# Patient Record
Sex: MALE | Race: WHITE | Employment: OTHER | URBAN - METROPOLITAN AREA
[De-identification: names, ages, dates, MRNs, and addresses within clinical notes are randomized per-mention and may not be internally consistent; named-entity substitution may affect disease eponyms.]

---

## 2018-04-19 ENCOUNTER — OFFICE VISIT (OUTPATIENT)
Dept: FAMILY MEDICINE CLINIC | Age: 78
End: 2018-04-19
Payer: MEDICARE

## 2018-04-19 VITALS
WEIGHT: 217 LBS | DIASTOLIC BLOOD PRESSURE: 100 MMHG | TEMPERATURE: 98.7 F | HEART RATE: 83 BPM | OXYGEN SATURATION: 96 % | HEIGHT: 67 IN | RESPIRATION RATE: 12 BRPM | BODY MASS INDEX: 34.06 KG/M2 | SYSTOLIC BLOOD PRESSURE: 150 MMHG

## 2018-04-19 DIAGNOSIS — H02.135 SENILE ECTROPION OF BOTH LOWER EYELIDS: ICD-10-CM

## 2018-04-19 DIAGNOSIS — R25.1 TREMOR OF LEFT HAND: ICD-10-CM

## 2018-04-19 DIAGNOSIS — R35.0 URINARY FREQUENCY: ICD-10-CM

## 2018-04-19 DIAGNOSIS — E55.9 VITAMIN D DEFICIENCY: ICD-10-CM

## 2018-04-19 DIAGNOSIS — Z86.73 HISTORY OF CVA (CEREBROVASCULAR ACCIDENT) WITHOUT RESIDUAL DEFICITS: ICD-10-CM

## 2018-04-19 DIAGNOSIS — I10 ESSENTIAL HYPERTENSION: Primary | ICD-10-CM

## 2018-04-19 DIAGNOSIS — H02.132 SENILE ECTROPION OF BOTH LOWER EYELIDS: ICD-10-CM

## 2018-04-19 DIAGNOSIS — R53.83 FATIGUE, UNSPECIFIED TYPE: ICD-10-CM

## 2018-04-19 DIAGNOSIS — H04.123 DRY EYES: ICD-10-CM

## 2018-04-19 DIAGNOSIS — R41.3 MEMORY LOSS: ICD-10-CM

## 2018-04-19 PROCEDURE — G8427 DOCREV CUR MEDS BY ELIG CLIN: HCPCS | Performed by: FAMILY MEDICINE

## 2018-04-19 PROCEDURE — G8417 CALC BMI ABV UP PARAM F/U: HCPCS | Performed by: FAMILY MEDICINE

## 2018-04-19 PROCEDURE — 1123F ACP DISCUSS/DSCN MKR DOCD: CPT | Performed by: FAMILY MEDICINE

## 2018-04-19 PROCEDURE — 93000 ELECTROCARDIOGRAM COMPLETE: CPT | Performed by: FAMILY MEDICINE

## 2018-04-19 PROCEDURE — 99203 OFFICE O/P NEW LOW 30 MIN: CPT | Performed by: FAMILY MEDICINE

## 2018-04-19 PROCEDURE — 1036F TOBACCO NON-USER: CPT | Performed by: FAMILY MEDICINE

## 2018-04-19 PROCEDURE — 4040F PNEUMOC VAC/ADMIN/RCVD: CPT | Performed by: FAMILY MEDICINE

## 2018-04-19 RX ORDER — TAMSULOSIN HYDROCHLORIDE 0.4 MG/1
0.4 CAPSULE ORAL DAILY
Qty: 30 CAPSULE | Refills: 3 | Status: SHIPPED | OUTPATIENT
Start: 2018-04-19 | End: 2018-05-21 | Stop reason: SDUPTHER

## 2018-04-19 RX ORDER — ASPIRIN 81 MG/1
81 TABLET ORAL DAILY
Qty: 30 TABLET | Refills: 3 | Status: SHIPPED | OUTPATIENT
Start: 2018-04-19

## 2018-04-19 RX ORDER — HYDROCHLOROTHIAZIDE 25 MG/1
25 TABLET ORAL DAILY
Qty: 30 TABLET | Refills: 3 | Status: SHIPPED | OUTPATIENT
Start: 2018-04-19 | End: 2018-05-21 | Stop reason: SDUPTHER

## 2018-04-19 ASSESSMENT — PATIENT HEALTH QUESTIONNAIRE - PHQ9
SUM OF ALL RESPONSES TO PHQ9 QUESTIONS 1 & 2: 2
1. LITTLE INTEREST OR PLEASURE IN DOING THINGS: 1
2. FEELING DOWN, DEPRESSED OR HOPELESS: 1
SUM OF ALL RESPONSES TO PHQ QUESTIONS 1-9: 2

## 2018-04-19 ASSESSMENT — ENCOUNTER SYMPTOMS
EYE REDNESS: 1
GASTROINTESTINAL NEGATIVE: 1
ALLERGIC/IMMUNOLOGIC NEGATIVE: 1
RESPIRATORY NEGATIVE: 1

## 2018-05-14 DIAGNOSIS — R25.1 TREMOR OF LEFT HAND: ICD-10-CM

## 2018-05-14 DIAGNOSIS — E55.9 VITAMIN D DEFICIENCY: ICD-10-CM

## 2018-05-14 DIAGNOSIS — R53.83 FATIGUE, UNSPECIFIED TYPE: ICD-10-CM

## 2018-05-14 DIAGNOSIS — R41.3 MEMORY LOSS: ICD-10-CM

## 2018-05-14 DIAGNOSIS — I10 ESSENTIAL HYPERTENSION: ICD-10-CM

## 2018-05-14 LAB
ALBUMIN SERPL-MCNC: 4.3 G/DL (ref 3.9–4.9)
ALP BLD-CCNC: 67 U/L (ref 35–104)
ALT SERPL-CCNC: 21 U/L (ref 0–41)
ANION GAP SERPL CALCULATED.3IONS-SCNC: 19 MEQ/L (ref 7–13)
AST SERPL-CCNC: 21 U/L (ref 0–40)
BASOPHILS ABSOLUTE: 0.1 K/UL (ref 0–0.2)
BASOPHILS RELATIVE PERCENT: 0.5 %
BILIRUB SERPL-MCNC: 0.8 MG/DL (ref 0–1.2)
BUN BLDV-MCNC: 13 MG/DL (ref 8–23)
CALCIUM SERPL-MCNC: 9.3 MG/DL (ref 8.6–10.2)
CHLORIDE BLD-SCNC: 96 MEQ/L (ref 98–107)
CHOLESTEROL, TOTAL: 271 MG/DL (ref 0–199)
CO2: 24 MEQ/L (ref 22–29)
CREAT SERPL-MCNC: 1.03 MG/DL (ref 0.7–1.2)
EOSINOPHILS ABSOLUTE: 0.1 K/UL (ref 0–0.7)
EOSINOPHILS RELATIVE PERCENT: 1.1 %
FOLATE: 17.9 NG/ML (ref 7.3–26.1)
GFR AFRICAN AMERICAN: >60
GFR NON-AFRICAN AMERICAN: >60
GLOBULIN: 2.9 G/DL (ref 2.3–3.5)
GLUCOSE BLD-MCNC: 167 MG/DL (ref 74–109)
HCT VFR BLD CALC: 48.2 % (ref 42–52)
HDLC SERPL-MCNC: 48 MG/DL (ref 40–59)
HEMOGLOBIN: 16.7 G/DL (ref 14–18)
LDL CHOLESTEROL CALCULATED: 193 MG/DL (ref 0–129)
LYMPHOCYTES ABSOLUTE: 2.8 K/UL (ref 1–4.8)
LYMPHOCYTES RELATIVE PERCENT: 26.7 %
MAGNESIUM: 2.1 MG/DL (ref 1.7–2.3)
MCH RBC QN AUTO: 32.7 PG (ref 27–31.3)
MCHC RBC AUTO-ENTMCNC: 34.7 % (ref 33–37)
MCV RBC AUTO: 94.4 FL (ref 80–100)
MONOCYTES ABSOLUTE: 1.2 K/UL (ref 0.2–0.8)
MONOCYTES RELATIVE PERCENT: 11.1 %
NEUTROPHILS ABSOLUTE: 6.4 K/UL (ref 1.4–6.5)
NEUTROPHILS RELATIVE PERCENT: 60.6 %
PDW BLD-RTO: 14.1 % (ref 11.5–14.5)
PLATELET # BLD: 254 K/UL (ref 130–400)
POTASSIUM SERPL-SCNC: 3.8 MEQ/L (ref 3.5–5.1)
RBC # BLD: 5.1 M/UL (ref 4.7–6.1)
SODIUM BLD-SCNC: 139 MEQ/L (ref 132–144)
TOTAL PROTEIN: 7.2 G/DL (ref 6.4–8.1)
TRIGL SERPL-MCNC: 151 MG/DL (ref 0–200)
TSH REFLEX: 2.4 UIU/ML (ref 0.27–4.2)
VITAMIN B-12: 920 PG/ML (ref 232–1245)
WBC # BLD: 10.5 K/UL (ref 4.8–10.8)

## 2018-05-17 LAB
VITAMIN D2 AND D3, TOTAL: 31.6 NG/ML (ref 30–80)
VITAMIN D2, 25 HYDROXY: <1 NG/ML
VITAMIN D3,25 HYDROXY: 31.6 NG/ML

## 2018-05-21 ENCOUNTER — OFFICE VISIT (OUTPATIENT)
Dept: FAMILY MEDICINE CLINIC | Age: 78
End: 2018-05-21
Payer: MEDICARE

## 2018-05-21 VITALS
HEIGHT: 67 IN | RESPIRATION RATE: 12 BRPM | OXYGEN SATURATION: 95 % | BODY MASS INDEX: 34.06 KG/M2 | TEMPERATURE: 99.1 F | SYSTOLIC BLOOD PRESSURE: 110 MMHG | DIASTOLIC BLOOD PRESSURE: 86 MMHG | WEIGHT: 217 LBS | HEART RATE: 110 BPM

## 2018-05-21 DIAGNOSIS — R73.02 GLUCOSE INTOLERANCE (IMPAIRED GLUCOSE TOLERANCE): ICD-10-CM

## 2018-05-21 DIAGNOSIS — R35.0 URINARY FREQUENCY: ICD-10-CM

## 2018-05-21 DIAGNOSIS — N40.1 BENIGN PROSTATIC HYPERPLASIA WITH NOCTURIA: ICD-10-CM

## 2018-05-21 DIAGNOSIS — H02.135 SENILE ECTROPION OF BOTH LOWER EYELIDS: ICD-10-CM

## 2018-05-21 DIAGNOSIS — E78.2 MIXED HYPERLIPIDEMIA: ICD-10-CM

## 2018-05-21 DIAGNOSIS — R35.1 BENIGN PROSTATIC HYPERPLASIA WITH NOCTURIA: ICD-10-CM

## 2018-05-21 DIAGNOSIS — I10 ESSENTIAL HYPERTENSION: Primary | ICD-10-CM

## 2018-05-21 DIAGNOSIS — H02.132 SENILE ECTROPION OF BOTH LOWER EYELIDS: ICD-10-CM

## 2018-05-21 PROCEDURE — G8417 CALC BMI ABV UP PARAM F/U: HCPCS | Performed by: FAMILY MEDICINE

## 2018-05-21 PROCEDURE — 4040F PNEUMOC VAC/ADMIN/RCVD: CPT | Performed by: FAMILY MEDICINE

## 2018-05-21 PROCEDURE — 1123F ACP DISCUSS/DSCN MKR DOCD: CPT | Performed by: FAMILY MEDICINE

## 2018-05-21 PROCEDURE — G8427 DOCREV CUR MEDS BY ELIG CLIN: HCPCS | Performed by: FAMILY MEDICINE

## 2018-05-21 PROCEDURE — 99214 OFFICE O/P EST MOD 30 MIN: CPT | Performed by: FAMILY MEDICINE

## 2018-05-21 PROCEDURE — 1036F TOBACCO NON-USER: CPT | Performed by: FAMILY MEDICINE

## 2018-05-21 RX ORDER — PRAVASTATIN SODIUM 10 MG
10 TABLET ORAL NIGHTLY
Qty: 30 TABLET | Refills: 5 | Status: SHIPPED | OUTPATIENT
Start: 2018-05-21 | End: 2018-08-28 | Stop reason: SDUPTHER

## 2018-05-21 RX ORDER — TAMSULOSIN HYDROCHLORIDE 0.4 MG/1
0.4 CAPSULE ORAL NIGHTLY
Qty: 30 CAPSULE | Refills: 11 | Status: SHIPPED | OUTPATIENT
Start: 2018-05-21 | End: 2018-12-05 | Stop reason: ALTCHOICE

## 2018-05-21 RX ORDER — HYDROCHLOROTHIAZIDE 25 MG/1
25 TABLET ORAL DAILY
Qty: 30 TABLET | Refills: 11 | Status: SHIPPED | OUTPATIENT
Start: 2018-05-21 | End: 2018-08-28 | Stop reason: SINTOL

## 2018-05-21 ASSESSMENT — ENCOUNTER SYMPTOMS
EYE DISCHARGE: 1
RESPIRATORY NEGATIVE: 1
ALLERGIC/IMMUNOLOGIC NEGATIVE: 1
GASTROINTESTINAL NEGATIVE: 1

## 2018-05-25 ENCOUNTER — TELEPHONE (OUTPATIENT)
Dept: FAMILY MEDICINE CLINIC | Age: 78
End: 2018-05-25

## 2018-08-20 DIAGNOSIS — I10 ESSENTIAL HYPERTENSION: Primary | ICD-10-CM

## 2018-08-20 DIAGNOSIS — E78.2 MIXED HYPERLIPIDEMIA: ICD-10-CM

## 2018-08-21 DIAGNOSIS — R73.02 GLUCOSE INTOLERANCE (IMPAIRED GLUCOSE TOLERANCE): ICD-10-CM

## 2018-08-21 DIAGNOSIS — R35.1 BENIGN PROSTATIC HYPERPLASIA WITH NOCTURIA: ICD-10-CM

## 2018-08-21 DIAGNOSIS — E78.2 MIXED HYPERLIPIDEMIA: ICD-10-CM

## 2018-08-21 DIAGNOSIS — N40.1 BENIGN PROSTATIC HYPERPLASIA WITH NOCTURIA: ICD-10-CM

## 2018-08-21 DIAGNOSIS — I10 ESSENTIAL HYPERTENSION: ICD-10-CM

## 2018-08-21 LAB
ALBUMIN SERPL-MCNC: 4 G/DL (ref 3.9–4.9)
ALP BLD-CCNC: 62 U/L (ref 35–104)
ALT SERPL-CCNC: 21 U/L (ref 0–41)
ANION GAP SERPL CALCULATED.3IONS-SCNC: 19 MEQ/L (ref 7–13)
AST SERPL-CCNC: 19 U/L (ref 0–40)
BASOPHILS ABSOLUTE: 0 K/UL (ref 0–0.2)
BASOPHILS RELATIVE PERCENT: 0.5 %
BILIRUB SERPL-MCNC: 0.7 MG/DL (ref 0–1.2)
BUN BLDV-MCNC: 20 MG/DL (ref 8–23)
CALCIUM SERPL-MCNC: 9.1 MG/DL (ref 8.6–10.2)
CHLORIDE BLD-SCNC: 95 MEQ/L (ref 98–107)
CHOLESTEROL, TOTAL: 247 MG/DL (ref 0–199)
CO2: 25 MEQ/L (ref 22–29)
CREAT SERPL-MCNC: 0.92 MG/DL (ref 0.7–1.2)
EOSINOPHILS ABSOLUTE: 0.2 K/UL (ref 0–0.7)
EOSINOPHILS RELATIVE PERCENT: 1.6 %
GFR AFRICAN AMERICAN: >60
GFR NON-AFRICAN AMERICAN: >60
GLOBULIN: 3.5 G/DL (ref 2.3–3.5)
GLUCOSE BLD-MCNC: 170 MG/DL (ref 74–109)
HBA1C MFR BLD: 7.9 % (ref 4.8–5.9)
HCT VFR BLD CALC: 49.8 % (ref 42–52)
HDLC SERPL-MCNC: 52 MG/DL (ref 40–59)
HEMOGLOBIN: 17.1 G/DL (ref 14–18)
LDL CHOLESTEROL CALCULATED: 174 MG/DL (ref 0–129)
LYMPHOCYTES ABSOLUTE: 2.8 K/UL (ref 1–4.8)
LYMPHOCYTES RELATIVE PERCENT: 29.9 %
MAGNESIUM: 2 MG/DL (ref 1.7–2.3)
MCH RBC QN AUTO: 32.8 PG (ref 27–31.3)
MCHC RBC AUTO-ENTMCNC: 34.3 % (ref 33–37)
MCV RBC AUTO: 95.5 FL (ref 80–100)
MONOCYTES ABSOLUTE: 1.2 K/UL (ref 0.2–0.8)
MONOCYTES RELATIVE PERCENT: 12.4 %
NEUTROPHILS ABSOLUTE: 5.2 K/UL (ref 1.4–6.5)
NEUTROPHILS RELATIVE PERCENT: 55.6 %
PDW BLD-RTO: 13.8 % (ref 11.5–14.5)
PLATELET # BLD: 214 K/UL (ref 130–400)
POTASSIUM SERPL-SCNC: 3.4 MEQ/L (ref 3.5–5.1)
RBC # BLD: 5.21 M/UL (ref 4.7–6.1)
SODIUM BLD-SCNC: 139 MEQ/L (ref 132–144)
TOTAL PROTEIN: 7.5 G/DL (ref 6.4–8.1)
TRIGL SERPL-MCNC: 106 MG/DL (ref 0–200)
TSH SERPL DL<=0.05 MIU/L-ACNC: 2.15 UIU/ML (ref 0.27–4.2)
WBC # BLD: 9.4 K/UL (ref 4.8–10.8)

## 2018-08-28 ENCOUNTER — OFFICE VISIT (OUTPATIENT)
Dept: FAMILY MEDICINE CLINIC | Age: 78
End: 2018-08-28
Payer: MEDICARE

## 2018-08-28 VITALS
TEMPERATURE: 96.4 F | WEIGHT: 212 LBS | DIASTOLIC BLOOD PRESSURE: 72 MMHG | RESPIRATION RATE: 14 BRPM | HEIGHT: 67 IN | BODY MASS INDEX: 33.27 KG/M2 | OXYGEN SATURATION: 98 % | HEART RATE: 75 BPM | SYSTOLIC BLOOD PRESSURE: 130 MMHG

## 2018-08-28 DIAGNOSIS — E78.2 MIXED HYPERLIPIDEMIA: ICD-10-CM

## 2018-08-28 DIAGNOSIS — E11.42 TYPE 2 DIABETES MELLITUS WITH DIABETIC POLYNEUROPATHY, WITHOUT LONG-TERM CURRENT USE OF INSULIN (HCC): ICD-10-CM

## 2018-08-28 DIAGNOSIS — E87.6 HYPOKALEMIA: ICD-10-CM

## 2018-08-28 DIAGNOSIS — I10 ESSENTIAL HYPERTENSION: Primary | ICD-10-CM

## 2018-08-28 DIAGNOSIS — I10 ESSENTIAL HYPERTENSION: ICD-10-CM

## 2018-08-28 LAB
CREATININE URINE: 216.8 MG/DL
MICROALBUMIN UR-MCNC: <1.2 MG/DL
MICROALBUMIN/CREAT UR-RTO: NORMAL MG/G (ref 0–30)

## 2018-08-28 PROCEDURE — 1123F ACP DISCUSS/DSCN MKR DOCD: CPT | Performed by: FAMILY MEDICINE

## 2018-08-28 PROCEDURE — 4040F PNEUMOC VAC/ADMIN/RCVD: CPT | Performed by: FAMILY MEDICINE

## 2018-08-28 PROCEDURE — 1036F TOBACCO NON-USER: CPT | Performed by: FAMILY MEDICINE

## 2018-08-28 PROCEDURE — 1101F PT FALLS ASSESS-DOCD LE1/YR: CPT | Performed by: FAMILY MEDICINE

## 2018-08-28 PROCEDURE — G8427 DOCREV CUR MEDS BY ELIG CLIN: HCPCS | Performed by: FAMILY MEDICINE

## 2018-08-28 PROCEDURE — G8417 CALC BMI ABV UP PARAM F/U: HCPCS | Performed by: FAMILY MEDICINE

## 2018-08-28 PROCEDURE — 99214 OFFICE O/P EST MOD 30 MIN: CPT | Performed by: FAMILY MEDICINE

## 2018-08-28 RX ORDER — LISINOPRIL 10 MG/1
10 TABLET ORAL DAILY
Qty: 30 TABLET | Refills: 11 | Status: SHIPPED | OUTPATIENT
Start: 2018-08-28

## 2018-08-28 RX ORDER — PRAVASTATIN SODIUM 20 MG
20 TABLET ORAL NIGHTLY
Qty: 30 TABLET | Refills: 11 | Status: SHIPPED | OUTPATIENT
Start: 2018-08-28 | End: 2018-12-05

## 2018-08-28 ASSESSMENT — PATIENT HEALTH QUESTIONNAIRE - PHQ9
2. FEELING DOWN, DEPRESSED OR HOPELESS: 0
SUM OF ALL RESPONSES TO PHQ9 QUESTIONS 1 & 2: 0
1. LITTLE INTEREST OR PLEASURE IN DOING THINGS: 0
SUM OF ALL RESPONSES TO PHQ QUESTIONS 1-9: 0
SUM OF ALL RESPONSES TO PHQ QUESTIONS 1-9: 0

## 2018-08-28 ASSESSMENT — ENCOUNTER SYMPTOMS
ALLERGIC/IMMUNOLOGIC NEGATIVE: 1
EYES NEGATIVE: 1
GASTROINTESTINAL NEGATIVE: 1
RESPIRATORY NEGATIVE: 1

## 2018-08-28 NOTE — PROGRESS NOTES
Subjective  Malena Condon, 69 y.o. male presents today with:  Chief Complaint   Patient presents with    Hypertension    Hyperlipidemia    Discuss Labs     Done 8/21/18       Hypertension   This is a chronic problem. The current episode started more than 1 year ago. The problem is unchanged. The problem is controlled. There are no associated agents to hypertension. Risk factors for coronary artery disease include dyslipidemia, male gender and diabetes mellitus. Past treatments include diuretics and alpha 1 blockers. The current treatment provides significant improvement. There are no compliance problems. Hypertensive end-organ damage includes CVA. There is no history of a hypertension causing med or a thyroid problem. Hyperlipidemia   This is a chronic problem. The current episode started more than 1 year ago. The problem is uncontrolled. Recent lipid tests were reviewed and are high. Exacerbating diseases include diabetes and obesity. Factors aggravating his hyperlipidemia include thiazides. Associated symptoms include a focal sensory loss. Current antihyperlipidemic treatment includes statins. The current treatment provides mild improvement of lipids. There are no compliance problems. Risk factors for coronary artery disease include diabetes mellitus, dyslipidemia, hypertension, male sex and obesity. Diabetes   He presents for his initial diabetic visit. He has type 2 diabetes mellitus. No MedicAlert identification noted. His disease course has been stable. There are no hypoglycemic associated symptoms. Associated symptoms include foot paresthesias. There are no hypoglycemic complications. Symptoms are stable. Diabetic complications include a CVA and peripheral neuropathy. Risk factors for coronary artery disease include dyslipidemia, diabetes mellitus, hypertension, male sex, obesity and sedentary lifestyle. When asked about current treatments, none were reported. He is following a generally healthy diet. Coordination and gait normal.   Skin: Skin is warm, dry and intact. No rash noted. Psychiatric: He has a normal mood and affect. His speech is normal and behavior is normal. Judgment and thought content normal.            Assessment & Plan    Diagnosis Orders   1. Essential hypertension  lisinopril (PRINIVIL;ZESTRIL) 10 MG tablet    CBC Auto Differential    Comprehensive Metabolic Panel    Lipid Panel    Magnesium    Microalbumin / Creatinine Urine Ratio    TSH without Reflex    Internal Referral to Diabetic Education-[facility]   2. Mixed hyperlipidemia  pravastatin (PRAVACHOL) 20 MG tablet    Comprehensive Metabolic Panel    Lipid Panel    TSH without Reflex    Internal Referral to Diabetic Education[facility]   3. Type 2 diabetes mellitus with diabetic polyneuropathy, without long-term current use of insulin (HCC)  pravastatin (PRAVACHOL) 20 MG tablet    lisinopril (PRINIVIL;ZESTRIL) 10 MG tablet    metFORMIN (GLUCOPHAGE) 500 MG tablet    Comprehensive Metabolic Panel    Hemoglobin A1C    Lipid Panel    Magnesium    Microalbumin / Creatinine Urine Ratio    TSH without Reflex    Internal Referral to Diabetic Education-[facility]    Amb External Referral To Podiatry   4.  Hypokalemia  lisinopril (PRINIVIL;ZESTRIL) 10 MG tablet    Comprehensive Metabolic Panel     Orders Placed This Encounter   Procedures    CBC Auto Differential     Standing Status:   Future     Standing Expiration Date:   8/28/2019    Comprehensive Metabolic Panel     Standing Status:   Future     Standing Expiration Date:   8/28/2019    Hemoglobin A1C     Standing Status:   Future     Standing Expiration Date:   8/28/2019    Lipid Panel     Standing Status:   Future     Standing Expiration Date:   8/28/2019     Order Specific Question:   Is Patient Fasting?/# of Hours     Answer:   8    Magnesium     Standing Status:   Future     Standing Expiration Date:   8/28/2019    Microalbumin / Creatinine Urine Ratio     Standing Status:   Future     Standing

## 2018-09-04 ENCOUNTER — HOSPITAL ENCOUNTER (OUTPATIENT)
Dept: DIABETES SERVICES | Age: 78
Setting detail: THERAPIES SERIES
Discharge: HOME | End: 2018-09-04
Payer: MEDICARE

## 2018-09-04 VITALS — WEIGHT: 217 LBS | HEIGHT: 67 IN | BODY MASS INDEX: 34.06 KG/M2

## 2018-09-04 PROCEDURE — G0108 DIAB MANAGE TRN  PER INDIV: HCPCS

## 2018-09-04 ASSESSMENT — PATIENT HEALTH QUESTIONNAIRE - PHQ9
SUM OF ALL RESPONSES TO PHQ QUESTIONS 1-9: 0
SUM OF ALL RESPONSES TO PHQ QUESTIONS 1-9: 0

## 2018-09-04 ASSESSMENT — PROBLEM AREAS IN DIABETES QUESTIONNAIRE (PAID)
WORRYING ABOUT THE FUTURE AND THE POSSIBILITY OF SERIOUS COMPLICATIONS: 0
FEELING DEPRESSED WHEN YOU THINK ABOUT LIVING WITH DIABETES: 0
COPING WITH COMPLICATIONS OF DIABETES: 0
FEELING THAT DIABETES IS TAKING UP TOO MUCH OF YOUR MENTAL AND PHYSICAL ENERGY EVERY DAY: 0

## 2018-09-04 ASSESSMENT — SLEEP AND FATIGUE QUESTIONNAIRES
HAVE YOU BEEN TOLD, OR NOTICED ON YOUR OWN, THAT YOU STOP BREATHING OR STRUGGLE TO BREATHE IN YOUR SLEEP: UNSURE
HAVE YOU EVER BEEN TESTED FOR SLEEP APNEA: NO
HOW MANY HOURS OF SLEEP ARE YOU GETTING, ON AVERAGE: <6
HOW DO YOU RATE THE QUALITY OF YOUR SLEEP: FAIR

## 2018-09-04 NOTE — PROGRESS NOTES
long term complications of diabetes. Identify preventative measures & standards of care. (1) 09/04/18 Joe Ferraro RN        Developing strategies to address psychosocial issues:  Describe feelings about living with diabetes; Describe how stress, depression & anxiety affect blood glucose; Identify coping strategies; Identify support needed & support network available. (1) 09/04/18 Joe Ferraro RN        Developing strategies to promote health/change behavior: Identify 7 self-care behaviors; Personal health risk factors; Benefits, challenges & strategies for behavioral change;    (1) 09/04/18 Joe Ferraro RN          Individualized goal selection. My goal , to help me improve my health, I will:     1. 09/04/18 Watch total carbs at meals and portions (goal 45-60 gm per meal). Joe Ferraro RN       2. Plan  Follow-up Appointments planned in group setting. Next Appointment in 1 weeks. September 11, 12, 13, 2018     Instruction Method: [x]Lecture/Discussion  []Power Point Presentation  [x]Handouts  []Return Demonstration      Education Materials/Equipment Provided:      [x]Self-Management  - Initial Assessment - Where do I Begin booklet and Counting Carbs from the ADA. []Self-Management  Class 1 - On the Road to better managing your diabetes - Packet of Handouts from Diabetes Department    [] Self-Management  Class 2 - Meal Plan,  Choose your Foods - Food Lists for [employer] and Nutrition in the [employer] Resources - fast facts about fast food    [] Self-Management  Class 3 -  Diabetes ID card,  foot care tips sheet,  Continuing Your Journey with Diabetes, Individualized Diabetes report card, Sick Day Rules, Diabetes Cookbooks, Magazines and Pedometers when available    []Glucose Meter     []Insulin Kit     []Other      Encounter Type Date Start Time End Time Comments No Show Dates   Assessment 09/04/18 Joe Ferraro RN    [phone] Attentive. Frustrated.  Has memory loss and issues with memory since stroke two years ago per patient. Not sure if patient is appropriate for education. Discussed with him and he would like to come to all of the sessions.  He feels it would be beneficial.     Class 1 - Understanding diabetes         Class 2- Nutrition and diabetes          Class 3 - Preventing Complications         Individual MNT         3 Month Follow-up      []In Person  []Telephone    Meter Instrx        Insulin Instrx      []Pen  []Vial & Syringe      DSMS Support Plan:  Follow-up plan:     [] MNT referral request / Appointment     [] Annual update referral request and appointment after      []ADA  Where do I Begin, Living with Type 2 Diabetes ADA home support program     [] [address] [place] [address] [phone]     [] Fit Walks : FREE [facility] or [facility]    []  Diabetes support Group      []   Emotional Support      [] Brenden on phone      []  Internet web sites - ADA and D- life    []  Journals/Magazines    []  Other ___________________________      Post Education Referrals:      [] [facility] information sheet and [address] [facility] , 21 [phone]      [] Dental care    [x] Podiatrist - has a referral - took up to Dr Yolanda Noble office after appt 09/04/18      []  Opthamologist      []Other    Nydia Carrera RN

## 2018-09-11 ENCOUNTER — HOSPITAL ENCOUNTER (OUTPATIENT)
Dept: DIABETES SERVICES | Age: 78
Setting detail: THERAPIES SERIES
Discharge: HOME | End: 2018-09-11
Payer: MEDICARE

## 2018-09-11 PROCEDURE — G0109 DIAB MANAGE TRN IND/GROUP: HCPCS

## 2018-09-11 NOTE — PROGRESS NOTES
of 15 to treat low BG. Reviewed the importance of monitoring at different times and occasionally post prandial. Encouraged to keep a log book. Vito Whittaker RN     Prevention, detection & treatment of acute complications:  Identify symptoms of hyper & hypoglycemia, and prevention & treatment strategies. (1) 09/04/18 Mel Thompson RN  09/11/18  Vito Whittaker RN     09/04/18 - Patient able to recognize signs and symptoms of high and low BG. Reviewed treatment of both high and low BG. Discussed causes of both high and low BG. Mel Thompson RN    09/11/18 - Patient able to recognize signs and symptoms of high and low BG. Reviewed treatment of both high and low BG. Discussed with the group causes of both high and low BG. Reviewed BG guidelines and troubleshooting unexpected numbers. Vito Whittaker RN     Describe sick day guidelines & indications for physician notification. Identify short term consequences of poor control. (1) 09/04/18 Mel Thompson RN        Prevention, detection & treatment of chronic complications:  Define the natural course of diabetes & describe the relationship of blood glucose levels to long term complications of diabetes. Identify preventative measures & standards of care. (1) 09/04/18 Mel Thompson RN        Developing strategies to address psychosocial issues:  Describe feelings about living with diabetes; Describe how stress, depression & anxiety affect blood glucose; Identify coping strategies; Identify support needed & support network available. (1) 09/04/18 Mel Thompson RN  09/11/18  Vito Whittaker RN     09/11/18 - Reviewed healthy coping and unhealthy coping with the group. Discussed what ways of coping each person usually uses and brainstormed ways to change to a healthy coping mechanism with the group. Stressed the importance of stress reduction and the negative effects of stress on the body including elevated BG.  Community resources and support

## 2018-09-12 ENCOUNTER — HOSPITAL ENCOUNTER (OUTPATIENT)
Dept: DIABETES SERVICES | Age: 78
Setting detail: THERAPIES SERIES
Discharge: HOME | End: 2018-09-12
Payer: MEDICARE

## 2018-09-12 PROCEDURE — G0109 DIAB MANAGE TRN IND/GROUP: HCPCS

## 2018-09-12 NOTE — PROGRESS NOTES
Diabetes Self- Management Education Program Assessment and Education Record-   Also see Diabetic Screening    PatientMisty,  here for diabetes self-management education initial assessment. Today's visit was in a group setting. Diet History :  Diet Questionnaire and typical meal /portion sheet completed  []Yes  [x] NO    Goals setting:  Initial Goal Set with Patient  [x]Yes  [] NO  (SEE  EDUCATION AND GOALS)    MEDICAL HISTORY:  Past Medical History:   Diagnosis Date    Hypertension     Stroke [facility]) ? 2016     No family history on file. Patient has no known allergies. There is no immunization history on file for this patient. Current Medications  Current Outpatient Prescriptions   Medication Sig Dispense Refill    pravastatin (PRAVACHOL) 20 MG tablet Take 1 tablet by mouth nightly 30 tablet 11    lisinopril (PRINIVIL;ZESTRIL) 10 MG tablet Take 1 tablet by mouth daily 30 tablet 11    metFORMIN (GLUCOPHAGE) 500 MG tablet Take 1 tablet by mouth daily (with breakfast) 30 tablet 11    tamsulosin (FLOMAX) 0.4 MG capsule Take 1 capsule by mouth nightly 30 capsule 11    aspirin EC 81 MG EC tablet Take 1 tablet by mouth daily 30 tablet 3     No current facility-administered medications for this encounter.    :     Comments:  Allergies:  No Known Allergies    Diabetes 5  / Health Status    A1C blood level - at goal < 7%   Lab Results   Component Value Date    LABA1C 7.9 (H) 08/21/2018     Lab Results   Component Value Date    LABMICR <1.20 08/28/2018    LDLCALC 174 (H) 08/21/2018    CREATININE 0.92 08/21/2018       Blood pressure (140/90)  Or less  BP Readings from Last 3 Encounters:   08/28/18 130/72   05/21/18 110/86   04/19/18 (!) 150/100        Cholesterol ( LDL under  100)   Lab Results   Component Value Date    LDLCALC 174 (H) 08/21/2018       4 . Smoking ? []Yes   [x]No (Quit in 2008)    5. Taking an Asprin daily?   [x]Yes   []No            Diabetes Self- Management Education Record    Participant Name: Ariadna Tan  Referring Provider: Jovita Cornelius DO   Assessment/Evaluation Ratings:  1=Needs Instruction   4=Demonstrates Understanding/Competency  2=Needs Review   NC=Not Covered    3=Comprehends Key Points  N/A=Not Applicable    Topics/Learning Objectives Initial Assessment Date:   Instr. Date Reinforce Date Post- session Eval Comments   Diabetes disease process & Treatment process: Define diabetes & pre-diabetes; Identify own type of diabetes; role of the pancreas; signs/symptoms; diagnostic criteria; prevention & treatment options; contributing factors. (1) 09/04/18 Mel Thompson RN  09/11/18  Vito Whittaker RN]   09/04/18 Reports poor knowledge and states \"i don't know anything about it\" Had a stroke about 2 years ago and has some memory issues, but wants to attend all education. Mel Thompson RN     09/11/18 Discussed basic pathophysiology of DM with the group including the pancreas, insulin, insulin resistance and livers involvement. Reviewed the different types of DM, risk factors, diagnosis, symptoms and the treatment options. Vito Whittaker RN     Incorporating nutritional management into lifestyle: Describe effect of type, amount & timing of food on blood glucose; Describe basic meal planning techniques & current nutrition guideline   (1) 09/04/18 Mel Thompson RN  9/12/18  JASON Foss   09/04/18 Reviewed the importance of eating a balanced diet including portion control and still eating carbohydrates. Mel Thompson RN     09/12/18 What to eat - Food groups, When to eat - timing of meals and snacks, and How much to eat - portions control. Laron Hall RDN LD     2000 calories/ day   4-5 CHO choices/ meal   15 CHO choices/  day   grams of protein /day   gram of fat /day     Correctly read food labels & demonstrate CHO counting & portion control with personalized meal plan. Identify dining out strategies, & dietary sick day guidelines.    (1) discuss more in class. Reviewed A1C and target A1C with patient. Loulou De Luna RN     9/11/18 - Discussed timing of monitoring and goals for BG. Aware of goal A1C and current A1C. BG guidelines reviewed and rule of 15 to treat low BG. Reviewed the importance of monitoring at different times and occasionally post prandial. Encouraged to keep a log book. Isaias Velazquez RN     Prevention, detection & treatment of acute complications:  Identify symptoms of hyper & hypoglycemia, and prevention & treatment strategies. (1) 09/04/18 Loulou De Luna RN  09/11/18  Isaias Velazquez RN     09/04/18 - Patient able to recognize signs and symptoms of high and low BG. Reviewed treatment of both high and low BG. Discussed causes of both high and low BG. Loulou De Luna RN    09/11/18 - Patient able to recognize signs and symptoms of high and low BG. Reviewed treatment of both high and low BG. Discussed with the group causes of both high and low BG. Reviewed BG guidelines and troubleshooting unexpected numbers. Isaias Velazquez RN     Describe sick day guidelines & indications for physician notification. Identify short term consequences of poor control. (1) 09/04/18 Loulou De Luna RN        Prevention, detection & treatment of chronic complications:  Define the natural course of diabetes & describe the relationship of blood glucose levels to long term complications of diabetes. Identify preventative measures & standards of care. (1) 09/04/18 Loulou De Luna RN        Developing strategies to address psychosocial issues:  Describe feelings about living with diabetes; Describe how stress, depression & anxiety affect blood glucose; Identify coping strategies; Identify support needed & support network available. (1) 09/04/18 Loulou De Luna RN  09/11/18  Isaias Velazquez RN     09/11/18 - Reviewed healthy coping and unhealthy coping with the group.  Discussed what ways of coping each person usually uses and brainstormed ways to change to a healthy coping mechanism with the group. Stressed the importance of stress reduction and the negative effects of stress on the body including elevated BG. Community resources and support networks reviewed and handout provided. Thomas Hankins RN     Developing strategies to promote health/change behavior: Identify 7 self-care behaviors; Personal health risk factors; Benefits, challenges & strategies for behavioral change;    (1) 09/04/18 Ryan Payne RN          Individualized goal selection. My goal , to help me improve my health, I will:     1. 09/04/18 Watch total carbs at meals and portions (goal 45-60 gm per meal). Ryan Payne RN   09/11/18  Patient rates this goal at 75%. He is trying to eat lunch each day. 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 Hx of CVA with right hand numb tingling. Thomas Hankins RN         Plan  Follow-up Appointments planned in group setting. Next Appointment in 1 weeks. September 11, 12, 13, 2018     Instruction Method: [x]Lecture/Discussion  []Power Point Presentation  [x]Handouts  []Return Demonstration      Education Materials/Equipment Provided:      [x]Self-Management  - Initial Assessment - Where do I Begin booklet and Counting Carbs from the ADA.     [x]Self-Management  Class 1 - On the Road to better managing your diabetes - Packet of Handouts from Diabetes Department    [x] Self-Management  Class 2 - Meal Plan,  Choose your Foods - Food Lists for [employer] and Nutrition in the [employer] Resources - fast facts about fast food    [] Self-Management  Class 3 -  Diabetes ID card,  foot care tips sheet,  Continuing Your Journey with Diabetes, Individualized Diabetes report card, Sick Day Rules, Diabetes Cookbooks, Magazines and Pedometers when available    []Glucose Meter     []Insulin Kit     []Other Encounter Type Date Start Time End Time Comments No Show Dates   Assessment 09/04/18 Lorri Quintana, RN    [phone] Attentive. Frustrated. Has memory loss and issues with memory since stroke two years ago per patient. Not sure if patient is appropriate for education. Discussed with him and he would like to come to all of the sessions. He feels it would be beneficial.     Class 1 - Understanding diabetes 09/11/18  Alee Johnson, RN   0900 1200  I feel pt did gain knowledge and tried to express himself ( admits to having expressive aphasia but did ok). He is not as determined but he does seem to want to make some small changes. Class 2- Nutrition and diabetes   09/12/18  Marcie Lehman RDN LD 0900 1200 Quiet in class, but shows good understanding.     Class 3 - Preventing Complications         Individual MNT         3 Month Follow-up      []In Person  []Telephone    Meter Instrx        Insulin Instrx      []Pen  []Vial & Syringe      DSMS Support Plan:  Follow-up plan:     [] MNT referral request / Appointment     [] Annual update referral request and appointment after      []ADA  Where do I Begin, Living with Type 2 Diabetes ADA home support program     [] [address] [place], [place] [phone]     [] Fit Walks : FREE [facility] or [facility]    []  Diabetes support Group      []   Emotional Support      [] Brenden on phone      []  Internet web sites - ADA and [employer]- life    []  Journals/Magazines    []  Other ___________________________      Post Education Referrals:      [] [address] [facility] information sheet and [address] [facility] , 21 [phone]      [] Dental care    [x] Podiatrist - has a referral - took up to Dr Prasanna García office after appt 09/04/18      []  Opthamologist      []Other

## 2018-09-13 ENCOUNTER — HOSPITAL ENCOUNTER (OUTPATIENT)
Dept: DIABETES SERVICES | Age: 78
Setting detail: THERAPIES SERIES
Discharge: HOME | End: 2018-09-13
Payer: MEDICARE

## 2018-09-13 PROCEDURE — G0109 DIAB MANAGE TRN IND/GROUP: HCPCS

## 2018-09-13 NOTE — PROGRESS NOTES
Diabetes Self- Management Education Program Assessment and Education Record-   Also see Diabetic Screening    Patient, Stevan Montalvo,  here for diabetes self-management education. Today's visit was in a group setting. Diet History :  Diet Questionnaire and typical meal /portion sheet completed  []Yes  [x] NO    Goals setting:  Initial Goal Reviewed with Patient  [x]Yes  [] NO  (SEE  EDUCATION AND GOALS)    MEDICAL HISTORY:  Past Medical History:   Diagnosis Date    Hypertension     Stroke [facility]) ? 2016     No family history on file. Patient has no known allergies. There is no immunization history on file for this patient. Current Medications  Current Outpatient Prescriptions   Medication Sig Dispense Refill    pravastatin (PRAVACHOL) 20 MG tablet Take 1 tablet by mouth nightly 30 tablet 11    lisinopril (PRINIVIL;ZESTRIL) 10 MG tablet Take 1 tablet by mouth daily 30 tablet 11    metFORMIN (GLUCOPHAGE) 500 MG tablet Take 1 tablet by mouth daily (with breakfast) 30 tablet 11    tamsulosin (FLOMAX) 0.4 MG capsule Take 1 capsule by mouth nightly 30 capsule 11    aspirin EC 81 MG EC tablet Take 1 tablet by mouth daily 30 tablet 3     No current facility-administered medications for this encounter.    :     Comments:  Allergies:  No Known Allergies    Diabetes 5  / Health Status    A1C blood level - at goal < 7%   Lab Results   Component Value Date    LABA1C 7.9 (H) 08/21/2018     Lab Results   Component Value Date    LABMICR <1.20 08/28/2018    LDLCALC 174 (H) 08/21/2018    CREATININE 0.92 08/21/2018       Blood pressure (140/90)  Or less  BP Readings from Last 3 Encounters:   08/28/18 130/72   05/21/18 110/86   04/19/18 (!) 150/100        Cholesterol ( LDL under  100)   Lab Results   Component Value Date    LDLCALC 174 (H) 08/21/2018       4 . Smoking ? []Yes   [x]No (Quit in 2008)    5. Taking an Asprin daily?   [x]Yes   []No            Diabetes Self- Management Education Record    Participant & treatment of chronic complications:  Define the natural course of diabetes & describe the relationship of blood glucose levels to long term complications of diabetes. Identify preventative measures & standards of care. (1) 09/04/18 Kristen Duncan RN  09/13/18 Kristen Duncan RN    09/13/18 - Reviewed natural course of T2DM with group and chronic complications related to elevated BG. Discussed chronic complications and how to prevent them from happening. Reviewed patient's ABC's with each patient and provided with ways to reach goal numbers. Kristen Duncan RN       Developing strategies to address psychosocial issues:  Describe feelings about living with diabetes; Describe how stress, depression & anxiety affect blood glucose; Identify coping strategies; Identify support needed & support network available. (1) 09/04/18 Kristen Duncan RN  09/11/18  Gardenia Dolan RN     09/11/18 - Reviewed healthy coping and unhealthy coping with the group. Discussed what ways of coping each person usually uses and brainstormed ways to change to a healthy coping mechanism with the group. Stressed the importance of stress reduction and the negative effects of stress on the body including elevated BG. Community resources and support networks reviewed and handout provided. Gardenia Dolan RN     Developing strategies to promote health/change behavior: Identify 7 self-care behaviors; Personal health risk factors; Benefits, challenges & strategies for behavioral change;    (1) 09/04/18 Kristen Duncan RN  09/13/18 Kristen Duncan RN    09/13/18 - Discussed willingness to change behaviors and allowed patient to select goal to work on. Willing to change. Goals set for behavior change and follow up scheduled. Kristen Duncan RN         Individualized goal selection. My goal , to help me improve my health, I will:     1. 09/04/18 Watch total carbs at meals and portions (goal 45-60 gm per meal).  Danica Cm Ciara Tafoya RN   09/11/18  Patient rates this goal at 75%. He is trying to eat lunch each day. 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 Hx of CVA with right hand numb tingling. Marianna Roach RN    3. 09/13/18 Attend eye exam in October. Luis Fernando Hammer RN          Plan  Follow-up as needed     Instruction Method: [x]Lecture/Discussion  [x]Power Point Presentation  [x]Handouts  []Return Demonstration      Education Materials/Equipment Provided:      [x]Self-Management  - Initial Assessment - Where do I Begin booklet and Counting Carbs from the ADA. [x]Self-Management  Class 1 - On the Road to better managing your diabetes - Packet of Handouts from Diabetes Department    [x] Self-Management  Class 2 - Meal Plan,  Choose your Foods - Food Lists for [employer] and Nutrition in the [place] [id number] - fast facts about fast food    [x] Self-Management  Class 3 -  Diabetes ID card,  foot care tips sheet,  Continuing Your Journey with Diabetes, Individualized Diabetes report card, Sick Day Rules, Diabetes Cookbooks, Magazines and Pedometers when available    []Glucose Meter     []Insulin Kit     []Other      Encounter Type Date Start Time End Time Comments No Show Dates   Assessment 09/04/18 Luis Fernando Hammer, MARV[place]    [id number] Attentive. Frustrated. Has memory loss and issues with memory since stroke two years ago per patient. Not sure if patient is appropriate for education. Discussed with him and he would like to come to all of the sessions. He feels it would be beneficial.     Class 1 - Understanding diabetes 09/11/18  Marianna oRach RN   0900 1200  I feel pt did gain knowledge and tried to express himself ( admits to having expressive aphasia but did ok). He is not as determined but he does seem to want to make some small changes.      Class 2- Nutrition and diabetes   09/12/18  Amie Hernandez RDN LD

## 2018-11-23 DIAGNOSIS — E87.6 HYPOKALEMIA: ICD-10-CM

## 2018-11-23 DIAGNOSIS — I10 ESSENTIAL HYPERTENSION: ICD-10-CM

## 2018-11-23 DIAGNOSIS — E78.2 MIXED HYPERLIPIDEMIA: ICD-10-CM

## 2018-11-23 DIAGNOSIS — E11.42 TYPE 2 DIABETES MELLITUS WITH DIABETIC POLYNEUROPATHY, WITHOUT LONG-TERM CURRENT USE OF INSULIN (HCC): ICD-10-CM

## 2018-11-23 LAB
ALBUMIN SERPL-MCNC: 4 G/DL (ref 3.9–4.9)
ALP BLD-CCNC: 61 U/L (ref 35–104)
ALT SERPL-CCNC: 12 U/L (ref 0–41)
ANION GAP SERPL CALCULATED.3IONS-SCNC: 17 MEQ/L (ref 7–13)
AST SERPL-CCNC: 15 U/L (ref 0–40)
BASOPHILS ABSOLUTE: 0.1 K/UL (ref 0–0.2)
BASOPHILS RELATIVE PERCENT: 0.6 %
BILIRUB SERPL-MCNC: 0.5 MG/DL (ref 0–1.2)
BUN BLDV-MCNC: 26 MG/DL (ref 8–23)
CALCIUM SERPL-MCNC: 9.2 MG/DL (ref 8.6–10.2)
CHLORIDE BLD-SCNC: 103 MEQ/L (ref 98–107)
CHOLESTEROL, TOTAL: 176 MG/DL (ref 0–199)
CO2: 21 MEQ/L (ref 22–29)
CREAT SERPL-MCNC: 1.3 MG/DL (ref 0.7–1.2)
CREATININE URINE: 258.1 MG/DL
EOSINOPHILS ABSOLUTE: 0.3 K/UL (ref 0–0.7)
EOSINOPHILS RELATIVE PERCENT: 3.1 %
GFR AFRICAN AMERICAN: >60
GFR NON-AFRICAN AMERICAN: 53.4
GLOBULIN: 3 G/DL (ref 2.3–3.5)
GLUCOSE BLD-MCNC: 172 MG/DL (ref 74–109)
HBA1C MFR BLD: 7 % (ref 4.8–5.9)
HCT VFR BLD CALC: 43.5 % (ref 42–52)
HDLC SERPL-MCNC: 42 MG/DL (ref 40–59)
HEMOGLOBIN: 15 G/DL (ref 14–18)
LDL CHOLESTEROL CALCULATED: 109 MG/DL (ref 0–129)
LYMPHOCYTES ABSOLUTE: 2.9 K/UL (ref 1–4.8)
LYMPHOCYTES RELATIVE PERCENT: 33.6 %
MAGNESIUM: 2 MG/DL (ref 1.7–2.3)
MCH RBC QN AUTO: 33.5 PG (ref 27–31.3)
MCHC RBC AUTO-ENTMCNC: 34.6 % (ref 33–37)
MCV RBC AUTO: 96.8 FL (ref 80–100)
MICROALBUMIN UR-MCNC: 1.3 MG/DL
MICROALBUMIN/CREAT UR-RTO: 5 MG/G (ref 0–30)
MONOCYTES ABSOLUTE: 0.9 K/UL (ref 0.2–0.8)
MONOCYTES RELATIVE PERCENT: 10.7 %
NEUTROPHILS ABSOLUTE: 4.6 K/UL (ref 1.4–6.5)
NEUTROPHILS RELATIVE PERCENT: 52 %
PDW BLD-RTO: 14.7 % (ref 11.5–14.5)
PLATELET # BLD: 247 K/UL (ref 130–400)
POTASSIUM SERPL-SCNC: 4 MEQ/L (ref 3.5–5.1)
RBC # BLD: 4.49 M/UL (ref 4.7–6.1)
SODIUM BLD-SCNC: 141 MEQ/L (ref 132–144)
TOTAL PROTEIN: 7 G/DL (ref 6.4–8.1)
TRIGL SERPL-MCNC: 123 MG/DL (ref 0–200)
TSH SERPL DL<=0.05 MIU/L-ACNC: 2 UIU/ML (ref 0.27–4.2)
WBC # BLD: 8.8 K/UL (ref 4.8–10.8)

## 2018-12-05 ENCOUNTER — OFFICE VISIT (OUTPATIENT)
Dept: FAMILY MEDICINE CLINIC | Age: 78
End: 2018-12-05
Payer: MEDICARE

## 2018-12-05 VITALS
HEART RATE: 76 BPM | WEIGHT: 204 LBS | SYSTOLIC BLOOD PRESSURE: 120 MMHG | TEMPERATURE: 96.1 F | RESPIRATION RATE: 16 BRPM | DIASTOLIC BLOOD PRESSURE: 80 MMHG | BODY MASS INDEX: 32.02 KG/M2 | HEIGHT: 67 IN

## 2018-12-05 DIAGNOSIS — E11.42 TYPE 2 DIABETES MELLITUS WITH DIABETIC POLYNEUROPATHY, WITHOUT LONG-TERM CURRENT USE OF INSULIN (HCC): Primary | ICD-10-CM

## 2018-12-05 DIAGNOSIS — E78.2 MIXED HYPERLIPIDEMIA: ICD-10-CM

## 2018-12-05 DIAGNOSIS — I10 ESSENTIAL HYPERTENSION: ICD-10-CM

## 2018-12-05 DIAGNOSIS — N18.2 STAGE 2 CHRONIC KIDNEY DISEASE: ICD-10-CM

## 2018-12-05 PROCEDURE — 1123F ACP DISCUSS/DSCN MKR DOCD: CPT | Performed by: FAMILY MEDICINE

## 2018-12-05 PROCEDURE — 99214 OFFICE O/P EST MOD 30 MIN: CPT | Performed by: FAMILY MEDICINE

## 2018-12-05 PROCEDURE — 1036F TOBACCO NON-USER: CPT | Performed by: FAMILY MEDICINE

## 2018-12-05 PROCEDURE — 1101F PT FALLS ASSESS-DOCD LE1/YR: CPT | Performed by: FAMILY MEDICINE

## 2018-12-05 PROCEDURE — G8484 FLU IMMUNIZE NO ADMIN: HCPCS | Performed by: FAMILY MEDICINE

## 2018-12-05 PROCEDURE — G8427 DOCREV CUR MEDS BY ELIG CLIN: HCPCS | Performed by: FAMILY MEDICINE

## 2018-12-05 PROCEDURE — 4040F PNEUMOC VAC/ADMIN/RCVD: CPT | Performed by: FAMILY MEDICINE

## 2018-12-05 PROCEDURE — G8417 CALC BMI ABV UP PARAM F/U: HCPCS | Performed by: FAMILY MEDICINE

## 2018-12-05 RX ORDER — PRAVASTATIN SODIUM 80 MG/1
80 TABLET ORAL DAILY
Qty: 90 TABLET | Refills: 3 | Status: SHIPPED | OUTPATIENT
Start: 2018-12-05

## 2018-12-05 ASSESSMENT — ENCOUNTER SYMPTOMS
ALLERGIC/IMMUNOLOGIC NEGATIVE: 1
BLURRED VISION: 1
RESPIRATORY NEGATIVE: 1
GASTROINTESTINAL NEGATIVE: 1

## 2019-02-26 ENCOUNTER — TELEPHONE (OUTPATIENT)
Dept: FAMILY MEDICINE CLINIC | Age: 79
End: 2019-02-26

## 2019-03-11 ENCOUNTER — TELEPHONE (OUTPATIENT)
Dept: FAMILY MEDICINE CLINIC | Age: 79
End: 2019-03-11

## 2019-03-19 ENCOUNTER — TELEPHONE (OUTPATIENT)
Dept: FAMILY MEDICINE CLINIC | Age: 79
End: 2019-03-19

## 2019-04-11 ENCOUNTER — TELEPHONE (OUTPATIENT)
Dept: DIABETES SERVICES | Age: 79
End: 2019-04-11

## 2019-04-11 NOTE — PROGRESS NOTES
Diabetes Self-Management Program Follow-Up    Name:  Shruti Edward   :  1940     Follow-Up Call - no answer and unable to leave a voicemail - mailbox is not set up. Will send a letter to patients home address asking him to call into the department to complete his follow up call since attending the education and evaluate goals.      Mark Johnson RN